# Patient Record
Sex: MALE | Race: WHITE | Employment: FULL TIME | URBAN - METROPOLITAN AREA
[De-identification: names, ages, dates, MRNs, and addresses within clinical notes are randomized per-mention and may not be internally consistent; named-entity substitution may affect disease eponyms.]

---

## 2021-02-16 ENCOUNTER — HOSPITAL ENCOUNTER (OUTPATIENT)
Dept: PHYSICAL THERAPY | Age: 66
Discharge: HOME | End: 2021-02-16
Payer: MEDICARE

## 2021-02-16 PROCEDURE — 97530 THERAPEUTIC ACTIVITIES: CPT

## 2021-02-16 PROCEDURE — 97110 THERAPEUTIC EXERCISES: CPT

## 2021-02-16 PROCEDURE — 97162 PT EVAL MOD COMPLEX 30 MIN: CPT

## 2021-02-16 NOTE — PROGRESS NOTES
PHYSICAL THERAPY - DAILY TREATMENT NOTE    Patient Name: Fer Carroll        Date: 2021  : 1955   YES Patient  Verified  Visit #:   1     Insurance: Payor: [place] MEDICARE / Plan: [place] MEDICARE PART A & B / Product Type: Medicare /      In time: 10:08 Out time: 10:50   Total Treatment Time: 42     Medicare/BCBS Time Tracking (below)   Total Timed Codes (min):  30 1:1 Treatment Time:  30     TREATMENT AREA =  L/S  SUBJECTIVE    Pain Level (on 0 to 10 scale):  0  / 10   Medication Changes/New allergies or changes in medical history, any new surgeries or procedures? YES    If yes, update Summary List   Subjective Functional Status/Changes:  []  No changes reported     History of Condition: Pt presents to PT with reports of chronic LBP that was exacerbated over the past few months. The pt lives an active lifestyle including theraband circuit training, walking on the treadmill, and golfing/surfing once a week. The pt has chronic medial epicondylitis which he has treated previously with dry needling offering temporary relief. The pt reports his symptoms are most noticeable while playing golf and surfing and is unaware of any particular movements or other treatment modalities to treat his pain at home. Aggravating Factors: Alleviating Factors:     Previous Treatment:     PMHx:see chart    Social/Recreational/Work:     Pt Goals: \"To be able to surf and golf for longer periods without being concerned for hurting my back. \"     FOTO: 63     LOW BACK EVALUATION    Objective:      Gait:    Posture:    Palpation/Sensation:    (N - normal; R - reduced; MR - markedly reduced)       L/S ROM      Range   Effect  Strength (MMT)          Right        Left    Flex N  Psoas (L2,3) 5 5   Ext MR  Quads (L3) 5 5   R-lat flex R  Ant tib(L4) 5 5   L-lat flex R  Hip Ext (S1, S2) 4+ 4+   R-rot R  Glut Med(L5) 5 5   L-rot R  Hamstrings(S1,S2) 5 5      Hip IR/ER 5/5 5/5     Strength (MMT)       Right     Left Core: Sup Bridge     Core: Side Bridge     Core: Prone plank       Special Tests                       Right     Left          Flexibility         Right              Left    Slump   90/90 HS     SLR   Samuel Hayes     Sl screen 3/5=70% LR   Adam     Gaenslen test   Hip IR (prone)     [place]/PAMELA sign   Hip ER (prone)     Thigh thrust        Distraction        Lateral Compression        Sacral Provocation          Effect of:  DKC    Supine Bridge    SL Supine Bridge    Prone on Elbows    RFIS    REIL          SI Symmetry:    Standing        Supine            Prone                Dynamic      +/- R/L  ASIS    Fwd Flex    IC    Stork    PSIS    Seated FF      Sup to Sit:     Modalities Rationale: To improve activity tolerance for strengthening exercises and ADL's.     min [] Estim, type/location:                                      []  att     []  unatt     []  w/US     []  w/ice    []  w/heat    min []  Mechanical Traction: type/lbs                   []  pro   []  sup   []  int   []  cont    []  before manual    []  after manual    min []  Ultrasound, settings/location:      min []  Iontophoresis w/ dexamethasone, location:                                               []  take home patch       []  in clinic    min []  Ice     []  Heat    location/position:     min []  Vasopneumatic Device, press/temp:     min []  Other:    [] Skin assessment post-treatment (if applicable):    [x]  intact    []  redness- no adverse reaction     []redness  adverse reaction:      15 min Therapeutic Exercise:  [x]  See flow sheet   Rationale:      increase ROM and increase strength to improve the patients ability to perform ADL's with improved hip flexor mobility and L/S AROM. 15 min Therapeutic Activity: FOTO, lifitng mechanics, sleep positioning education and demonstration   Rationale:    To improve safety and efficiency with ADL's.     min Patient Education:  []  Review HEP   []  Progressed/Changed HEP based on: HEP initiated      Other Objective/Functional Measures:    See above information     Post Treatment Pain Level (on 0 to 10) scale:   0  / 10     ASSESSMENT    Assessment/Changes in Function:     Justification for Eval Code Complexity: moderate  Patient History (low 0, mod 1-2, high 3-4): right UE medial epicondylitis, history of bilateral benitez procedures, bilateral knee OA HIGH     Examination (low 1-2, mod 3+, high 4+): LE AROM, LE MMT, L/S AROM, L/S repeated motions HIGH   Clinical Presentation (low: stable/uncomplicated; mod: evolving; high: unstable/unpredictable): evolving AROM with exercise MODERATE  Clinical Decision Making (low , mod 26-74, high 1-25): 63 moderate     []  See Progress Note/Recertification   Patient will continue to benefit from skilled PT services to modify and progress therapeutic interventions, address functional mobility deficits, address ROM deficits, address strength deficits, analyze and address soft tissue restrictions, analyze and cue movement patterns, analyze and modify body mechanics/ergonomics and assess and modify postural abnormalities to attain remaining goals.    Progress toward goals / Updated goals:    Goals established, see PoC     PLAN    [x]  Upgrade activities as tolerated YES Continue plan of care   []  Discharge due to :    [x]  Other: Initiate PoC     Therapist: Isabel Oseguera    Date: 2/16/2021 Time: 10:13 AM

## 2021-02-16 NOTE — PROGRESS NOTES
[facility] PHYSICAL THERAPY  [address] [facility] [facility], [address] - Phone: [phone]  Fax: [phone] / [address] [address] PHYSICAL THERAPY SERVICES  Patient Name: Belen Nelson : 1955   Medical   Diagnosis: Low back pain [M54.5] Treatment Diagnosis: Mechanical LBP   Onset Date: Chronic 2016     Referral Source: Shant Hooks MD [place] [facility]): 2021   Prior Hospitalization: See medical history Provider #:  [id number]   Prior Level of Function: Independent with ADL's, active lifestyle   Comorbidities: right UE medial epicondylitis, history of bilateral benitez procedures, bilateral knee OA    Medications: Verified on Patient Summary List   The Plan of Care and following information is based on the information from the initial evaluation.   ===========================================================================================  Assessment / key information:  Belen Nelson is a 77 y.o.  male with Dx: Low back pain [M54.5], signs and symptoms consistent with referring diagnosis. Pt presents to PT with reports of chronic LBP that was exacerbated over the past few months. The pt lives an active lifestyle including theraband circuit training, walking on the treadmill, and golfing/surfing once a week. The pt has chronic medial epicondylitis which he has treated previously with dry needling offering temporary relief. The pt reports his symptoms are most noticeable while playing golf and surfing and is unaware of any particular movements or other treatment modalities to treat his pain at home. Objective: The pt demonstrates minimal functional deficits including a 75-80% decrease in L/S extension AROM, and moderate hip flexor tightness. The pt demonstrates minimal hip girdle MMT deficits only including 4+/5 hip abduction MMT bilaterally.  The pt demonstrates no hamstring tightness (5 degrees bilateral on hamstring 90/90 test) and no TTP along the paraspinals, QL or hip flexors. The patient's FOTO score of 63 points indicates 37% limitation in functional ability. The patient would benefit from skilled PT to address the below listed impairments. Thank you for your referral.  =======================================l===================================================  Eval Complexity: History: HIGH Complexity :3+ comorbidities / personal factors will impact the outcome/ POC Exam:HIGH Complexity : 4+ Standardized tests and measures addressing body structure, function, activity limitation and / or participation in recreation  Presentation: MEDIUM Complexity : Evolving with changing characteristics  Clinical Decision Making:MEDIUM Complexity : FOTO score of 26-74Overall Complexity:MEDIUM    Problem List: pain affecting function, decrease ROM, decrease strength, impaired gait/ balance, decrease ADL/ functional abilitiies, decrease activity tolerance, decrease flexibility/ joint mobility and decrease transfer abilities   Treatment Plan may include any combination of the following: Therapeutic exercise, Therapeutic activities, Neuromuscular re-education, Physical agent/modality, Gait/balance training, Manual therapy, Patient education, Self Care training, Functional mobility training, Home safety training and Stair training    Patient / Family readiness to learn indicated by: asking questions, trying to perform skills and interest  Persons(s) to be included in education: patient (P)  Barriers to Learning/Limitations: None  Measures taken: na   Patient Goal (s): \"To be able to surf and golf for longer periods without being concerned for hurting my back. \"   Patient self reported health status: good  Rehabilitation Potential: good   Short Term Goals: To be accomplished in  2-3  weeks:  1. Patient will demonstrate compliance with HEP for symptom management at home.   2. Patient will report at least 3/7 on the [facility] JEROME in order to improved tolerance for surfing recreational activities. 3. Patient will demonstrates at least 50% extension L/S AROM in order to improve efficiency with reaching ADL's.  Long Term Goals: To be accomplished in  4-6  weeks:  1. Patient will be independent with HEP to self-manage and prevent symptoms upon DC. 2.  Patient will improve FOTO score to at least 70 points to indicate improved functional status. 3.  Patient will demonstrate 5/5 hip abduction MMT bilaterally to improve pelvic stability with recreational activity. Frequency / Duration:   Patient to be seen  1-2  times per week for 3-4  weeks:  Patient / Caregiver education and instruction: self care, activity modification and exercises      Therapist Signature: Tad Duncan DPT Date: 2/25/2158   Certification Period: 2/16/21 - 5/15/21 Time: 11:05 AM   ===========================================================================================  I certify that the above Physical Therapy Services are being furnished while the patient is under my care. I agree with the treatment plan and certify that this therapy is necessary. Physician Signature:        Date:       Time:        Maya Kathleen MD  Please sign and return to [facility] or you may fax the signed copy to [phone]. Thank you.

## 2021-02-23 ENCOUNTER — HOSPITAL ENCOUNTER (OUTPATIENT)
Dept: PHYSICAL THERAPY | Age: 66
Discharge: HOME | End: 2021-02-23
Payer: MEDICARE

## 2021-02-23 PROCEDURE — 97530 THERAPEUTIC ACTIVITIES: CPT

## 2021-02-23 PROCEDURE — 97110 THERAPEUTIC EXERCISES: CPT

## 2021-02-23 NOTE — PROGRESS NOTES
PHYSICAL THERAPY - DAILY TREATMENT NOTE    Patient Name: Nitesh Reza        Date: 2021  : 1955   yes Patient  Verified  Visit #:     Insurance: Payor: Hector Joya / Plan: [place] MEDICARE PART A & B / Product Type: Medicare /      In time: 544 Out time: 815   Total Treatment Time: 50     Medicare/BCBS Time Tracking (below)   Total Timed Codes (min):  50 1:1 Treatment Time:  50     TREATMENT AREA =  Low back pain [M54.5]    SUBJECTIVE  Pain Level (on 0 to 10 scale):  3  / 10   Medication Changes/New allergies or changes in medical history, any new surgeries or procedures?    no  If yes, update Summary List   Subjective Functional Status/Changes:  []  No changes reported     \"I tried the HEP she gave me, not much pain relief\"          OBJECTIVE    35 min Therapeutic Exercise:  [x]  See flow sheet   Rationale:      increase ROM, increase strength and improve coordination to improve the patients ability to safely perform ADLs, bending/stooping/ lifting; prolong sitting, standing and ambulation; and hold yoga poses with no pain and with min to no limitations       15 min Therapeutic Activity: [x]  See flow sheet  postural/bed mobility training  squat IR/ER   whitk fwd/lateral   Rationale:    increase ROM, increase strength and improve coordination to improve the patients ability to  safely perform ADLs, bending/stooping/ lifting; prolong sitting, standing and ambulation; and hold yoga poses with no pain and with min to no limitations    Billed With/As:   [x] TE   [x] TA   [] Neuro   [] Self Care Patient Education: [x] Review HEP    [] Progressed/Changed HEP based on:   [x] positioning   [x] body mechanics   [x] transfers   [x] heat/ice application    [x] other: Pt ed on importance and benefits of compliance with HEP, core strength/stability and proper posture; pt verbalized understanding         Other Objective/Functional Measures:    VCs + demo to perform proper technique for TE  Initiated TE per flowsheet without c/o p! Reviewed proper bed mobility, sleeping positions, lifting techniques and importance and benefits of a neutral spine    ISAI>REIL= B/B decrease \"tightness\"   Post Treatment Pain Level (on 0 to 10) scale:   1  / 10     ASSESSMENT  Assessment/Changes in Function:     Progressed there-ex without c/o increase p!  demos decrease HS flexibility B LE  VCs to decrease both knee anterior translation with squats     []  See Progress Note/Recertification   Patient will continue to benefit from skilled PT services to modify and progress therapeutic interventions, address functional mobility deficits, address ROM deficits, address strength deficits, analyze and address soft tissue restrictions, analyze and cue movement patterns, analyze and modify body mechanics/ergonomics, assess and modify postural abnormalities and instruct in home and community integration to attain remaining goals.    Progress toward goals / Updated goals:  Pt's first visit since IE, no noted progress        PLAN  [x]  Upgrade activities as tolerated yes Continue plan of care   []  Discharge due to :    []  Other:      Therapist: Wilian De La Vega PTA    Date: 2/23/2021 Time: 7:39 AM     Future Appointments   Date Time Provider German Campos   2/25/2021  9:30 AM Litzy Gonsalez PT [facility]   3/2/2021  7:15 AM Romaine Gutierrez PTA [facility]   3/4/2021  8:00 AM Kirby Drew PT [facility]   3/9/2021  7:15 AM IBETH DaughertyPTKALYANI [facility]   3/11/2021  8:00 AM Kirby Drew PT [facility]   3/16/2021  7:15 AM IBETH Daugherty [facility]   3/18/2021  8:00 AM Kirby Drew PT [facility]   3/23/2021  7:15 AM IBETH Daugherty [facility]   3/25/2021  8:00 AM Kirby Drew, PT [facility]   3/30/2021  7:15 AM Sarika Whelan, PTA [facility]

## 2021-02-25 ENCOUNTER — HOSPITAL ENCOUNTER (OUTPATIENT)
Dept: PHYSICAL THERAPY | Age: 66
Discharge: HOME | End: 2021-02-25
Payer: MEDICARE

## 2021-02-25 PROCEDURE — 97110 THERAPEUTIC EXERCISES: CPT

## 2021-02-25 PROCEDURE — 97112 NEUROMUSCULAR REEDUCATION: CPT

## 2021-02-25 PROCEDURE — 97530 THERAPEUTIC ACTIVITIES: CPT

## 2021-02-25 NOTE — PROGRESS NOTES
PHYSICAL THERAPY - DAILY TREATMENT NOTE    Patient Name: Le Webster        Date: 2021  : 1955   YES Patient  Verified  Visit #:   3   of   12  Insurance: Payor: Adebayo Davis / Plan: [place] MEDICARE PART A & B / Product Type: Medicare /      In time: 9:25 Out time: 10:10   Total Treatment Time: 45     Medicare/BCBS Time Tracking (below)   Total Timed Codes (min):  45 1:1 Treatment Time:  45     TREATMENT AREA = Low back pain [M54.5]    SUBJECTIVE    Pain Level (on 0 to 10 scale):  2-3   10   Medication Changes/New allergies or changes in medical history, any new surgeries or procedures? NO    If yes, update Summary List   Subjective Functional Status/Changes:  []  No changes reported     \"Not so bad right now. Do you think I'll have to do these types of exercises the rest of my life? \"          OBJECTIVE     Therapeutic Procedures:  Min Procedure Specifics + Rationale   n/a [x]  Patient Education (performed throughout session) [x] Review HEP    [] Progressed/Changed HEP based on:   [] proper performance and advancement of Therex/TA   [] reduction in pain level    [] increased functional capacity       [] change in directional preference   20 [x] Therapeutic Exercise   [x]  See Flowsheet   Rationale: increase ROM and increase strength to improve the patients ability to participate in ADL's    15 [x] Therapeutic Activity   [x]  See Flowsheet  Hinging with stick  Loaded carries with 20#kb  Bottom's up KB static hold  Rationale: To improve safety, proprioception, coordination, and efficiency with tasks   10 [x] Neuromuscular Re-ed   [x]  See Flowsheet  Bird Dogs  BASIL/REIL PRN  Rationale: increase ROM, increase strength, improve coordination, improve balance and increase proprioception  to improve the patients ability to safely participate in ADL's         Other Objective/Functional Measures:    Increased reps/sets/resistance per flow sheet.        Post Treatment Pain Level (on 0 to 10) scale:   1  / 10     ASSESSMENT    Assessment/Changes in Function:       Increased reps/sets/resistance per flow sheet. Patient will continue to benefit from skilled PT services to modify and progress therapeutic interventions, address functional mobility deficits, address ROM deficits, address strength deficits, analyze and address soft tissue restrictions, analyze and cue movement patterns, analyze and modify body mechanics/ergonomics and instruct in home and community integration  to attain remaining goals   Progress toward goals / Updated goals:    Compliant towards HEP     PLAN    [x]  Upgrade activities as tolerated  [x]  Update interventions per flow sheet YES Continue plan of care   []  Discharge due to :    []  Other:      Therapist: Alyce Lee \"BJ\" Buddy, DPT, Cert. MDT, Cert. DN, Cert. SMT, Dip.  Osteopractic    Date: 2/25/2021 Time: 9:27 AM     Future Appointments   Date Time Provider German Campos   2/25/2021  9:30 AM Joe Bueno, PT [facility]   3/2/2021  7:15 AM Myriam De Santiago PTA [facility]   3/4/2021  8:00 AM Joe Bueno PT [facility]   3/9/2021  7:15 AM Myriam De Santiago PTA [facility]   3/11/2021  8:00 AM Joe Bueno PT [facility]   3/16/2021  7:15 AM Myriam De Santiago PTA [facility]   3/18/2021  8:00 AM Joe Bueno PT [facility]   3/23/2021  7:15 AM Myriam De Santiago PTA [facility]   3/25/2021  8:00 AM Joe Bueno PT [facility]   3/30/2021  7:15 AM Sarika Whelan, PTA [facility]

## 2021-03-02 ENCOUNTER — HOSPITAL ENCOUNTER (OUTPATIENT)
Dept: PHYSICAL THERAPY | Age: 66
Discharge: HOME | End: 2021-03-02
Payer: MEDICARE

## 2021-03-02 PROCEDURE — 97110 THERAPEUTIC EXERCISES: CPT

## 2021-03-02 PROCEDURE — 97530 THERAPEUTIC ACTIVITIES: CPT

## 2021-03-02 NOTE — PROGRESS NOTES
PHYSICAL THERAPY - DAILY TREATMENT NOTE    Patient Name: Dalila Palacios        Date: 3/2/2021  : 1955   yes Patient  Verified  Visit #:      12  Insurance: Payor: Kitty Chang / Plan: [place] MEDICARE PART A & B / Product Type: Medicare /      In time: 621 Out time: 789   Total Treatment Time: 51     Medicare/BCBS Time Tracking (below)   Total Timed Codes (min):  51 1:1 Treatment Time:  51     TREATMENT AREA =  Low back pain [M54.5]    SUBJECTIVE  Pain Level (on 0 to 10 scale): \"good\" 0-1 / 10   Medication Changes/New allergies or changes in medical history, any new surgeries or procedures?    no  If yes, update Summary List   Subjective Functional Status/Changes:  []  No changes reported     \"I had a good weekend I went golfing and surfing\"          OBJECTIVE    26 min Therapeutic Exercise:  [x]  See flow sheet   Rationale:      increase ROM, increase strength and improve coordination to improve the patients ability to safely perform ADLs, bending/stooping/ lifting; prolong sitting, standing and ambulation; and hold yoga poses with no pain and with min to no limitations       25 min Therapeutic Activity: [x]  See flow sheet  postural/bed mobility training  squat IR/ER   monsterwalk fwd/retro/lateral  suitcase carry 20#  static hold Bottoms up   Rationale:    increase ROM, increase strength and improve coordination to improve the patients ability to  safely perform ADLs, bending/stooping/ lifting; prolong sitting, standing and ambulation; and hold yoga poses with no pain and with min to no limitations    Billed With/As:   [x] TE   [x] TA   [] Neuro   [] Self Care Patient Education: [x] Review HEP    [] Progressed/Changed HEP based on:   [x] positioning   [x] body mechanics   [x] transfers   [x] heat/ice application    [x] other: Pt ed on importance and benefits of compliance with HEP, core strength/stability and proper posture; pt verbalized understanding  See updated HEP in chart       Other Objective/Functional Measures:    VCs + demo to perform proper technique for TE    Initiated retro monsterwalk, and pallof press #2,3 without c/o p!    (I) with suitcase carry x 60' wit 20#KB     Post Treatment Pain Level (on 0 to 10) scale:  0-1  / 10     ASSESSMENT  Assessment/Changes in Function:   demos proper squat form with instruction   demos good static 10# hold with bottoms up TE   []  See Progress Note/Recertification   Patient will continue to benefit from skilled PT services to modify and progress therapeutic interventions, address functional mobility deficits, address ROM deficits, address strength deficits, analyze and address soft tissue restrictions, analyze and cue movement patterns, analyze and modify body mechanics/ergonomics, assess and modify postural abnormalities and instruct in home and community integration to attain remaining goals. Progress toward goals / Updated goals: · Short Term Goals: To be accomplished in  2-3  weeks:  1. Patient will demonstrate compliance with HEP for symptom management at home. Established HEP  2. Patient will report at least 3/7 on the [facility] JEROME in order to improved tolerance for surfing recreational activities. 3. Patient will demonstrates at least 50% extension L/S AROM in order to improve efficiency with reaching ADL's.progressing, addressed with REIL  · Long Term Goals: To be accomplished in  4-6  weeks:  1. Patient will be independent with HEP to self-manage and prevent symptoms upon DC. 2.  Patient will improve FOTO score to at least 70 points to indicate improved functional status.   3.  Patient will demonstrate 5/5 hip abduction MMT bilaterally to improve pelvic stability with recreational activity     PLAN  [x]  Upgrade activities as tolerated yes Continue plan of care   []  Discharge due to :    []  Other:      Therapist: Herman Perez PTA    Date: 3/2/2021 Time: 8:37 AM     Future Appointments   Date Time Provider German Campos   3/4/2021  8:00 AM Cherry Vera, PT [facility]   3/9/2021  7:15 AM Cammie Hutchison, PTA [facility]   3/11/2021  8:00 AM Cherry Vera, PT [facility]   3/16/2021  7:15 AM Cammie Hutchison, PTA [facility]   3/18/2021  8:00 AM Cherry Vera, PT [facility]   3/23/2021  7:15 AM Cammie Hutchison, PTA [facility]   3/25/2021  8:00 AM Cherry Vera, PT [facility]   3/30/2021  7:15 AM Sarika Whelan, PTA [facility]

## 2021-03-04 ENCOUNTER — HOSPITAL ENCOUNTER (OUTPATIENT)
Dept: PHYSICAL THERAPY | Age: 66
Discharge: HOME | End: 2021-03-04
Payer: MEDICARE

## 2021-03-04 PROCEDURE — 97112 NEUROMUSCULAR REEDUCATION: CPT

## 2021-03-04 PROCEDURE — 97530 THERAPEUTIC ACTIVITIES: CPT

## 2021-03-04 NOTE — PROGRESS NOTES
PHYSICAL THERAPY - DAILY TREATMENT NOTE    Patient Name: Montana Hagen        Date: 3/4/2021  : 1955   YES Patient  Verified  Visit #:     Insurance: Payor: Kevin Keep / Plan: [place] MED[place] PART A & B / Product Type: Medicare /      In time: 7:54 Out time: 8:50   Total Treatment Time: 56     Medicare/BCBS Time Tracking (below)   Total Timed Codes (min):  56 1:1 Treatment Time:  56     TREATMENT AREA = Low back pain [M54.5]    SUBJECTIVE    Pain Level (on 0 to 10 scale):  0-1  / 10   Medication Changes/New allergies or changes in medical history, any new surgeries or procedures? NO    If yes, update Summary List   Subjective Functional Status/Changes:  []  No changes reported     \"I'm getting better\"          OBJECTIVE     Therapeutic Procedures:  Min Procedure Specifics + Rationale   n/a [x]  Patient Education (performed throughout session) [x] Review HEP    [] Progressed/Changed HEP based on:   [] proper performance and advancement of Therex/TA   [] reduction in pain level    [] increased functional capacity       [] change in directional preference   41 [x] Therapeutic Activity   [x]  See Flowsheet  KB Standing Pull Over  KB Around the world feet shoulder width/feet together  1/2 kneeling KB Pullover   Rope figure 8's  KB Bottom's Up Carry in 90 degree flexion  KB Pullover to Goblet Squat  Plank of knees  Rationale:  To improve safety, proprioception, coordination, and efficiency with tasks   15 [x] Neuromuscular Re-ed   [x]  See Flowsheet  Bird Dogs  REIL Progressions    Rationale: increase ROM, increase strength, improve coordination, improve balance and increase proprioception  to improve the patients ability to safely participate in ADL's         Other Objective/Functional Measures:    Performed Pallof series, then advanced #1 in 1/2 kneeling position (all 4 positions) and #3 in tall kneelinbg position     Post Treatment Pain Level (on 0 to 10) scale:   0  / 10 ASSESSMENT    Assessment/Changes in Function:       Right flank/obliques challenged by Pallof presses in half kneeling and right side plank         Patient will continue to benefit from skilled PT services to modify and progress therapeutic interventions, address functional mobility deficits, address ROM deficits, address strength deficits, analyze and address soft tissue restrictions, analyze and cue movement patterns, analyze and modify body mechanics/ergonomics and instruct in home and community integration  to attain remaining goals   Progress toward goals / Updated goals:    Good form and performance under load. PLAN    [x]  Upgrade activities as tolerated  [x]  Update interventions per flow sheet YES Continue plan of care   []  Discharge due to :    []  Other:      Therapist: Edgard Person \"BJ\" Buddy, MARIE, Cert. MDT, Cert. DN, Cert. SMT, Dip.  Osteopractic    Date: 3/4/2021 Time: 7:54 AM     Future Appointments   Date Time Provider German Campos   3/4/2021  8:00 AM Mancil Fallow, PT [facility]   3/9/2021  7:15 AM Brenita , PTA [facility]   3/11/2021  8:00 AM Mancil Fallow, PT [facility]   3/16/2021  7:15 AM Brenita , PTA [facility]   3/18/2021  8:00 AM Mancil Fallow, PT [facility]   3/23/2021  7:15 AM Brenita , PTA [facility]   3/25/2021  8:00 AM Mancil Fallow, PT [facility]   3/30/2021  7:15 AM Sarika Whelan, PTA [facility]

## 2021-03-09 ENCOUNTER — HOSPITAL ENCOUNTER (OUTPATIENT)
Dept: PHYSICAL THERAPY | Age: 66
Discharge: HOME | End: 2021-03-09
Payer: MEDICARE

## 2021-03-09 PROCEDURE — 97110 THERAPEUTIC EXERCISES: CPT

## 2021-03-09 PROCEDURE — 97530 THERAPEUTIC ACTIVITIES: CPT

## 2021-03-09 NOTE — PROGRESS NOTES
PHYSICAL THERAPY - DAILY TREATMENT NOTE    Patient Name: Sylvia Nuñez        Date: 3/9/2021  : 1955   yes Patient  Verified  Visit #:      12  Insurance: Payor: William Herrera / Plan: [place] MEDICARE PART A & B / Product Type: Medicare /      In time: 936 Out time:  380   Total Treatment Time: 55      Medicare/BCBS Time Tracking (below)   Total Timed Codes (min):  55 1:1 Treatment Time:  55     TREATMENT AREA =  Low back pain [M54.5]    SUBJECTIVE  Pain Level (on 0 to 10 scale): 0 / 10   Medication Changes/New allergies or changes in medical history, any new surgeries or procedures?    no  If yes, update Summary List   Subjective Functional Status/Changes:  []  No changes reported     \"Just sore from yard walk, I was up and down doing the things.  I am feeling good\"          OBJECTIVE    25 min Therapeutic Exercise:  [x]  See flow sheet   Rationale:      increase ROM, increase strength and improve coordination to improve the patients ability to safely perform ADLs, bending/stooping/ lifting; prolong sitting, standing and ambulation; and hold yoga poses with no pain and with min to no limitations        30 min Therapeutic Activity: [x]  See flow sheet  postural training  squat IR/ER   monsterwalk fwd/retro/lateral  10 # KB Around the world feet shoulder width/feet together  10# 1/2 kneeling KB Pullover   10 # KB Bottom's Up Carry in 90 degree flexion  20# KB to Goblet Squat to 18''   Plank of knees   Rationale:    increase ROM, increase strength and improve coordination to improve the patients ability to  safely perform ADLs, bending/stooping/ lifting; prolong sitting, standing and ambulation; and hold yoga poses with no pain and with min to no limitations    Billed With/As:   [x] TE   [x] TA   [] Neuro   [] Self Care Patient Education: [x] Review HEP    [] Progressed/Changed HEP based on:   [x] positioning   [x] body mechanics   [x] transfers   [x] heat/ice application    [x] other: Pt ed on importance and benefits of compliance with HEP, core strength/stability and proper posture; pt verbalized understanding  added pallof press to HEP     Other Objective/Functional Measures:    VCs + demo to perform proper technique for TE    CCs to decrease anterior translation to promote proper tech    performed open book at wall, decrease right T/s rot AROM  (I) with bottoms up carry x 60' with 10# KB     Post Treatment Pain Level (on 0 to 10) scale:  0/ 10     ASSESSMENT  Assessment/Changes in Function:   demelvis fwd head and chest with KB OH TE, able to correct with instruction   clarence good goblet squat form with 20# KB to 25''    []  See Progress Note/Recertification   Patient will continue to benefit from skilled PT services to modify and progress therapeutic interventions, address functional mobility deficits, address ROM deficits, address strength deficits, analyze and address soft tissue restrictions, analyze and cue movement patterns, analyze and modify body mechanics/ergonomics, assess and modify postural abnormalities and instruct in home and community integration to attain remaining goals. Progress toward goals / Updated goals: · Short Term Goals: To be accomplished in  2-3  weeks:  1. Patient will demonstrate compliance with HEP for symptom management at home. MET- compliant with HEP  2. Patient will report at least 3/7 on the [employer] JEROME in order to improved tolerance for surfing recreational activities. 3. Patient will demonstrates at least 50% extension L/S AROM in order to improve efficiency with reaching ADL's.progressing, addressed with REIL  · Long Term Goals: To be accomplished in  4-6  weeks:  1. Patient will be independent with HEP to self-manage and prevent symptoms upon DC. 2.  Patient will improve FOTO score to at least 70 points to indicate improved functional status.   3.  Patient will demonstrate 5/5 hip abduction MMT bilaterally to improve pelvic stability with recreational activity     PLAN  [x] Upgrade activities as tolerated yes Continue plan of care   []  Discharge due to :    []  Other:      Therapist: Bertrand Yoder PTA    Date: 3/9/2021 Time: 8:47 AM     Future Appointments   Date Time Provider German Campos   3/11/2021  8:00 AM Harvey Degree, PT [facility]   3/16/2021  7:15 AM Christina Lan, PTA [facility]   3/18/2021  8:00 AM Harvey Degree, PT [facility]   3/23/2021  7:15 AM Christina Lan, IBETH [facility]   3/25/2021  8:00 AM Harvey Harvey, PT [facility]   3/30/2021  7:15 AM [facility]

## 2021-03-11 ENCOUNTER — HOSPITAL ENCOUNTER (OUTPATIENT)
Dept: PHYSICAL THERAPY | Age: 66
Discharge: HOME | End: 2021-03-11
Payer: MEDICARE

## 2021-03-11 PROCEDURE — 97530 THERAPEUTIC ACTIVITIES: CPT

## 2021-03-11 NOTE — PROGRESS NOTES
[address] [address] [facility] PHYSICAL THERAPY  [address] [place], [address] - Phone: [phone]  Fax: [phone]  PROGRESS NOTE  Patient Name: Meghan Marie : 1955   Treatment/Medical Diagnosis: Low back pain [M54.5]   Referral Source: Holly Calhoun MD     Date of Initial Visit: 21 Attended Visits: 7 Missed Visits: 0     SUMMARY OF TREATMENT  Patient's POC has consisted of therex, therapeutic activities, manual therapy prn, modalities prn, pt. education, and a comprehensive HEP. Treatment strategies used to address functional mobility deficits, ROM deficits, strength deficits, analyze and address soft tissue restrictions, analyze and cue movement patterns, analyze and modify body mechanics/ergonomics, assess and modify postural abnormalities and instruct in home and community integration. CURRENT STATUS  Patient making excellent progress, reducing overall pain and improving strength for ADL's. Treatments have revealed lack of mobility to his hips and ankles which contribute to compensatory movement patterns that compromise his L/S when performing higher level activities like yard work. Goal/Measure of Progress Goal Met? 1. Patient will demonstrate compliance with HEP for symptom management at home. 2. Patient will report at least 3/7 on the [facility][employer] JEROME in order to improved tolerance for surfing recreational activities. 3. Patient will demonstrates at least 50% extension L/S AROM in order to improve efficiency with reaching ADL's MET    MET      MET     New Goals to be achieved in __2-4__  weeks:  1. Patient will be independent with HEP to self-manage and prevent symptoms upon DC. 2.  Patient will improve FOTO score to at least 70 points to indicate improved functional status. 3.  Patient will demonstrate 5/5 hip abduction MMT bilaterally to improve pelvic stability with recreational activity.   4. Patient to demonstrate safe and proper floor to stand transfers to promote safety and independence when performing yard work. Frequency / Duration:   Patient to be seen  2  times per week for 2-4  weeks:    RECOMMENDATIONS  Continue and progress functional therex/therapeutic activity as able, utilizing manual therapy and modalities prn. Progress patient towards independent HEP to facilitate self-management of symptoms and progress gains after PT. If you have any questions/comments please contact us directly at [phone]. Thank you for allowing us to assist in the care of your patient. Therapist Signature: Carl Oneal \"PEDRO\" Roseline Cadena, ZEUST, Cert. MDT, Cert. DN, Cert. SMT, Dip. Osteopractic Date: 3/29/0534   Certification Period: 2/16/21-5/15/21     Reporting Period 2/16/21-3/11/21   Time: 9:00 AM   NOTE TO PHYSICIAN:  PLEASE COMPLETE THE ORDERS BELOW AND FAX TO   [facility] [place] [facility]: [phone]. If you are unable to process this request in 24 hours please contact our office: [phone].    ___ I have read the above report and request that my patient continue as recommended.   ___ I have read the above report and request that my patient continue therapy with the following changes/special instructions:_________________________________________________________   ___ I have read the above report and request that my patient be discharged from therapy.      Physician Signature:        Date:       Time:                                        Lupe Samson MD

## 2021-03-11 NOTE — PROGRESS NOTES
PHYSICAL THERAPY - DAILY TREATMENT NOTE    Patient Name: Jordon Mead        Date: 3/11/2021  : 1955   YES Patient  Verified  Visit #:     Insurance: Payor: Cash Cummings / Plan: [place] MEDICARE PART A & B / Product Type: Medicare /      In time: 7:55 Out time: 8:47   Total Treatment Time: 53     Medicare/BCBS Time Tracking (below)   Total Timed Codes (min):  53 1:1 Treatment Time:  53     TREATMENT AREA = Low back pain [M54.5]    SUBJECTIVE    Pain Level (on 0 to 10 scale):  0  / 10   Medication Changes/New allergies or changes in medical history, any new surgeries or procedures? NO    If yes, update Summary List   Subjective Functional Status/Changes:  []  No changes reported     \"The back is actually doing ok. My issue is my right knee right now. I got home from work and did about an hour of yard work using pruning aakash and I had to squat to protect my back. \"          OBJECTIVE     Therapeutic Procedures:  Min Procedure Specifics + Rationale   n/a [x]  Patient Education (performed throughout session) [x] Review HEP    [] Progressed/Changed HEP based on:   [] proper performance and advancement of Therex/TA   [] reduction in pain level    [] increased functional capacity       [] change in directional preference   53 [x] Therapeutic Activity   [x]  See Flowsheet  Around the World   Around the World to Shotput catch  KB Bottom's Up 90 degree walk  [place] Goblet Squat to plinth  Goblet Squat to plinth  SK Racked Squat  Repeated DF in standing  Clubbell Racked Squat to plinth  Rationale: To improve safety, proprioception, coordination, and efficiency with tasks         Other Objective/Functional Measures:    Noted lack of ankle mobility during squat progression.  Provided ankle self mobilization       Post Treatment Pain Level (on 0 to 10) scale:   0  / 10     ASSESSMENT    Assessment/Changes in Function:       See PN         Patient will continue to benefit from skilled PT services to modify and progress therapeutic interventions, address functional mobility deficits, address ROM deficits, address strength deficits, analyze and address soft tissue restrictions, analyze and cue movement patterns, analyze and modify body mechanics/ergonomics and instruct in home and community integration  to attain remaining goals   Progress toward goals / Updated goals:    See PN     PLAN    [x]  Upgrade activities as tolerated  [x]  Update interventions per flow sheet YES Continue plan of care   []  Discharge due to :    []  Other:      Therapist: Dharmesh Trejo \"BJ\" MARIE Goodwin, Cert. MDT, Cert. DN, Cert. SMT, Dip.  Osteopractic    Date: 3/11/2021 Time: 7:59 AM     Future Appointments   Date Time Provider German Campos   3/11/2021  8:00 AM Harvey Degree, PT [facility]   3/16/2021  7:15 AM Christina Lan, IBETH [facility]   3/18/2021  8:00 AM Harvey Degree, PT [facility]   3/23/2021  7:15 AM Christina Lan PTA [facility]   3/25/2021  8:00 AM Harvey Harvey, PT [facility]   3/30/2021  7:15 AM IBETH Carson

## 2021-03-16 ENCOUNTER — HOSPITAL ENCOUNTER (OUTPATIENT)
Dept: PHYSICAL THERAPY | Age: 66
Discharge: HOME | End: 2021-03-16
Payer: MEDICARE

## 2021-03-16 PROCEDURE — 97110 THERAPEUTIC EXERCISES: CPT

## 2021-03-16 PROCEDURE — 97530 THERAPEUTIC ACTIVITIES: CPT

## 2021-03-16 NOTE — PROGRESS NOTES
PHYSICAL THERAPY - DAILY TREATMENT NOTE    Patient Name: Jordon Mead        Date: 3/16/2021  : 1955   yes Patient  Verified  Visit #:      12  Insurance: Payor: Cash Cummings / Plan: [place] MEDICARE PART A & B / Product Type: Medicare /      In time: 360 Out time: 663   Total Treatment Time: 52     Medicare/BCBS Time Tracking (below)   Total Timed Codes (min):   52 1:1 Treatment Time:  52     TREATMENT AREA =  Low back pain [M54.5]    SUBJECTIVE  Pain Level (on 0 to 10 scale): 0 / 10   Medication Changes/New allergies or changes in medical history, any new surgeries or procedures?    no  If yes, update Summary List   Subjective Functional Status/Changes:  []  No changes reported     \"Surfed Friday, golfed Saturday, and was in the yard .  I was sore for sure but the pain is better\"     OBJECTIVE    22 min Therapeutic Exercise:  [x]  See flow sheet   Rationale:      increase ROM, increase strength and improve coordination to improve the patients ability to safely perform ADLs, bending/stooping/ lifting; prolong sitting, standing and ambulation; and hold yoga poses with no pain and with min to no limitations        30 min Therapeutic Activity: [x]  See flow sheet  postural training  squat IR/ER   monsterwalk fwd/retro/lateral  10 # KB Around the world feet shoulder width/feet together  10# 1/2 kneeling KB Pullover   10 # KB Bottom's Up Carry in 90 degree flexion  RDL with 20# KB  farmer's carry   Rationale:    increase ROM, increase strength and improve coordination to improve the patients ability to  safely perform ADLs, bending/stooping/ lifting; prolong sitting, standing and ambulation; and hold yoga poses with no pain and with min to no limitations    Billed With/As:   [x] TE   [x] TA   [] Neuro   [] Self Care Patient Education: [x] Review HEP    [] Progressed/Changed HEP based on:   [x] positioning   [x] body mechanics   [x] transfers   [x] heat/ice application    [x] other: Pt ed on importance and benefits of compliance with HEP, core strength/stability and proper posture; pt verbalized understanding  added pallof press to HEP     Other Objective/Functional Measures:    VCs + demo to perform proper technique for TE  (I) with farmers carry x 70' ea UE with 20# KB/25 #KB  VCs to decrease knee flex with RDLs to promote glut/HS activation  (I) with front rack carry with 10# KB  x 70' ea UE  initiated butt burners, and dynamic HS str without pain or difficulty    Post Treatment Pain Level (on 0 to 10) scale:  0/ 10     ASSESSMENT  Assessment/Changes in Function:   increased to GTB with monsterwalk with no c/o p!  no limitations today   demos p! free, and proper RDL with instruction    []  See Progress Note/Recertification   Patient will continue to benefit from skilled PT services to modify and progress therapeutic interventions, address functional mobility deficits, address ROM deficits, address strength deficits, analyze and address soft tissue restrictions, analyze and cue movement patterns, analyze and modify body mechanics/ergonomics, assess and modify postural abnormalities and instruct in home and community integration to attain remaining goals.    Progress toward goals / Updated goals:  See PN last visit, no changes noted at this time       PLAN  [x]  Upgrade activities as tolerated yes Continue plan of care   []  Discharge due to :    []  Other:      Therapist: Herman Perez PTA    Date: 3/16/2021 Time: 8:19 AM     Future Appointments   Date Time Provider German Campos   3/18/2021  8:00 AM Socorro Cardozo PT [facility]   3/23/2021  7:15 AM Mariana Quintana PTA [facility]   3/25/2021  8:00 AM Socorro Cardozo PT [facility]   3/30/2021  7:15 AM HenlineDeleta Dandy, PTA [facility]

## 2021-03-18 ENCOUNTER — HOSPITAL ENCOUNTER (OUTPATIENT)
Dept: PHYSICAL THERAPY | Age: 66
Discharge: HOME | End: 2021-03-18
Payer: MEDICARE

## 2021-03-18 PROCEDURE — 97110 THERAPEUTIC EXERCISES: CPT

## 2021-03-18 PROCEDURE — 97530 THERAPEUTIC ACTIVITIES: CPT

## 2021-03-18 NOTE — PROGRESS NOTES
PHYSICAL THERAPY - DAILY TREATMENT NOTE    Patient Name: Yogi Whatley        Date: 3/18/2021  : 1955   YES Patient  Verified  Visit #:     Insurance: Payor: Angella Reyes / Plan: [place] MEDICARE PART A & B / Product Type: Medicare /      In time: 7:50 Out time: 8:45   Total Treatment Time: 55     Medicare/BCBS Time Tracking (below)   Total Timed Codes (min):  55 1:1 Treatment Time:  53     TREATMENT AREA = Low back pain [M54.5]    SUBJECTIVE    Pain Level (on 0 to 10 scale):  0  / 10   Medication Changes/New allergies or changes in medical history, any new surgeries or procedures? NO    If yes, update Summary List   Subjective Functional Status/Changes:  []  No changes reported     \"I feel like I'm getting stronger and stronger. \"          OBJECTIVE     Therapeutic Procedures:  Min Procedure Specifics + Rationale   n/a [x]  Patient Education (performed throughout session) [x] Review HEP    [] Progressed/Changed HEP based on:   [] proper performance and advancement of Therex/TA   [] reduction in pain level    [] increased functional capacity       [] change in directional preference   10 [x] Therapeutic Exercise   [x]  See Flowsheet   Rationale: increase ROM and increase strength to improve the patients ability to participate in ADL's    45(43 [x] Therapeutic Activity   [x]  See Flowsheet  Loaded carries with KB  Johnny Deanz carry with 20# KB  -Racked Carry with 20# Kb  -'s Carry with 10# KB  Offset Carry suitcase/rack (20/10)  Offset Carry Bottom's Up 's/Suitcase (/20)  Goblet Sit <->stand 2x10 c 10#  KB ATB x10 c 20#  Standing KB Pullover x10 10#  H/L KB Pulover 5#, 10#  TGU Roll to Elbow (Shoe)  Rationale: To improve safety, proprioception, coordination, and efficiency with tasks         Other Objective/Functional Measures:    Noted lack of right dorsiflexion. Left oblique sling weaker than right.       Post Treatment Pain Level (on 0 to 10) scale:   0  / 10 ASSESSMENT    Assessment/Changes in Function:       Per above, movement patterns improved with VC's         Patient will continue to benefit from skilled PT services to modify and progress therapeutic interventions, address functional mobility deficits, address ROM deficits, address strength deficits, analyze and address soft tissue restrictions, analyze and cue movement patterns, analyze and modify body mechanics/ergonomics and instruct in home and community integration  to attain remaining goals   Progress toward goals / Updated goals:    Progressing in functional strength and mobility     PLAN    [x]  Upgrade activities as tolerated  [x]  Update interventions per flow sheet YES Continue plan of care   []  Discharge due to :    []  Other:      Therapist: Angelique Narayan \"BJ\" Tip Pop, DPT, Cert. MDT, Cert. DN, Cert. SMT, Dip.  Osteopractic    Date: 3/18/2021 Time: 8:04 AM     Future Appointments   Date Time Provider German Campos   3/23/2021  7:15 AM Shalonda May PTA [facility]   3/25/2021  8:00 AM Peace Navarro, PT [facility]   3/30/2021  7:15 AM Shalonda May PTA [facility]   4/6/2021  7:15 AM Shalonda May PTA [facility]   4/8/2021  8:00 AM Peace Navarro, PT [facility]   4/13/2021  7:15 AM Shalonda May PTA [facility]   4/15/2021  8:00 AM Peace Navarro, PT [facility]

## 2021-03-23 ENCOUNTER — HOSPITAL ENCOUNTER (OUTPATIENT)
Dept: PHYSICAL THERAPY | Age: 66
Discharge: HOME | End: 2021-03-23
Payer: MEDICARE

## 2021-03-23 PROCEDURE — 97110 THERAPEUTIC EXERCISES: CPT

## 2021-03-23 PROCEDURE — 97530 THERAPEUTIC ACTIVITIES: CPT

## 2021-03-23 NOTE — PROGRESS NOTES
PHYSICAL THERAPY - DAILY TREATMENT NOTE    Patient Name: Le Webster        Date: 3/23/2021  : 1955   yes Patient  Verified  Visit #:   10  of   12  Insurance: Payor: Adebayo Davis / Plan: [place] MED[place]ARE PART A & B / Product Type: Medicare /      In time: 435 Out time: 810   Total Treatment Time: 52     Medicare/BCBS Time Tracking (below)   Total Timed Codes (min):   52 1:1 Treatment Time:  52     TREATMENT AREA =  Low back pain [M54.5]    SUBJECTIVE  Pain Level (on 0 to 10 scale): 0 / 10   Medication Changes/New allergies or changes in medical history, any new surgeries or procedures?    no  If yes, update Summary List   Subjective Functional Status/Changes:  []  No changes reported     \"I feel stronger and less pain\"     OBJECTIVE    17 min Therapeutic Exercise:  [x]  See flow sheet   Rationale:      increase ROM, increase strength and improve coordination to improve the patients ability to safely perform ADLs, bending/stooping/ lifting; prolong sitting, standing and ambulation; and hold yoga poses with no pain and with min to no limitations        35 min Therapeutic Activity: [x]  See flow sheet  postural training  squat IR/ER   irmaterwalk fwd/retro/lateral  10 # KB Around [place] feet shoulder width/feet together  10# KB Pullover standing/5# in HL    10 # KB waittress Carry> + suitcase carry 20# KB  RDL with 45# KB  goblet   farmer's carry 25#/20#KB   Rationale:    increase ROM, increase strength and improve coordination to improve the patients ability to  safely perform ADLs, bending/stooping/ lifting; prolong sitting, standing and ambulation; and hold yoga poses with no pain and with min to no limitations    Billed With/As:   [x] TE   [x] TA   [] Neuro   [] Self Care Patient Education: [x] Review HEP    [] Progressed/Changed HEP based on:   [x] positioning   [x] body mechanics   [x] transfers   [x] heat/ice application    [x] other: Pt ed on importance and benefits of compliance with HEP, core strength/stability and proper posture; pt verbalized understanding     Other Objective/Functional Measures:    VCs + demo to perform proper technique for TE  (I) with all weighted carry TE without producing pain    increased to 45# from 20# with RDLs and increased to 20# with around the [place] from 10# without difficulty    Post Treatment Pain Level (on 0 to 10) scale:  0/ 10     ASSESSMENT  Assessment/Changes in Function:   demos good squat form with no pain   decrease HS flexibility    []  See Progress Note/Recertification   Patient will continue to benefit from skilled PT services to modify and progress therapeutic interventions, address functional mobility deficits, address ROM deficits, address strength deficits, analyze and address soft tissue restrictions, analyze and cue movement patterns, analyze and modify body mechanics/ergonomics, assess and modify postural abnormalities and instruct in home and community integration to attain remaining goals. Progress toward goals / Updated goals:  New Goals to be achieved in __2-4__  weeks:  1.  Patient will be independent with HEP to self-manage and prevent symptoms upon DC. 2.  Patient will improve FOTO score to at least 70 points to indicate improved functional status. 3.  Patient will demonstrate 5/5 hip abduction MMT bilaterally to improve pelvic stability with recreational activity. addressed with lateral amb  4. Patient to demonstrate safe and proper floor to stand transfers to promote safety and independence when performing yard work. progressing, reports no difficulty with yard work x 2 weeks     PLAN  [x]  Upgrade activities as tolerated yes Continue plan of care   []  Discharge due to :    []  Other:      Therapist: Amanda German PTA    Date: 3/23/2021 Time: 9:15 AM     Future Appointments   Date Time Provider German Campos   3/25/2021  8:00 AM Kieran Almeida, PT [facility]   3/30/2021  7:15 AM Handy Greco PTA [facility]   4/6/2021  7:15 AM Alethea Rausch, [place] [facility]   4/8/2021  8:00 AM Tamanna Thompson, PT [facility]   4/13/2021  7:15 AM [facility]   4/15/2021  8:00 AM Tamanna Thompson, PT [facility]

## 2021-03-25 ENCOUNTER — HOSPITAL ENCOUNTER (OUTPATIENT)
Dept: PHYSICAL THERAPY | Age: 66
Discharge: HOME | End: 2021-03-25
Payer: MEDICARE

## 2021-03-25 PROCEDURE — 97110 THERAPEUTIC EXERCISES: CPT

## 2021-03-25 PROCEDURE — 97530 THERAPEUTIC ACTIVITIES: CPT

## 2021-03-25 NOTE — PROGRESS NOTES
PHYSICAL THERAPY - DAILY TREATMENT NOTE    Patient Name: Cherri Bains        Date: 3/25/2021  : 1955   YES Patient  Verified  Visit #:   11   of   12-15  Insurance: Payor: Debby Weaver / Plan: [place] MEDICARE PART A & B / Product Type: Medicare /      In time: 7:50 Out time: 8:44   Total Treatment Time: 54     Medicare/BCBS Time Tracking (below)   Total Timed Codes (min):  54 1:1 Treatment Time:  53     TREATMENT AREA = Low back pain [M54.5]    SUBJECTIVE    Pain Level (on 0 to 10 scale):  0  / 10   Medication Changes/New allergies or changes in medical history, any new surgeries or procedures? NO    If yes, update Summary List   Subjective Functional Status/Changes:  []  No changes reported     \"I ordered a 10lb club. \"          OBJECTIVE     Therapeutic Procedures:  Min Procedure Specifics + Rationale   n/a [x]  Patient Education (performed throughout session) [x] Review HEP    [] Progressed/Changed HEP based on:   [] proper performance and advancement of Therex/TA   [] reduction in pain level    [] increased functional capacity       [] change in directional preference   15(14) [x] Therapeutic Exercise   [x]  See Flowsheet   Rationale: increase ROM and increase strength to improve the patients ability to participate in ADL's    40 [x] Therapeutic Activity   [x]  See Flowsheet  KB-  20# ATB 2x15  10# Standing Pullover  10# Halo  Goblet Squat  Clubbell-  Order Press Out  Order Squat    TGU with Shoe  TGU with 5# KB    Mace-(10#)  Switch Squat  Offset Overhead Lunge  Side loaded lunge  Rationale:  To improve safety, proprioception, coordination, and efficiency with tasks         Other Objective/Functional Measures:    Educated patient re: mace form  TC's, VC's, and Demonstration provided for Mace/Club/KB and TGU     Post Treatment Pain Level (on 0 to 10) scale:   0  / 10     ASSESSMENT    Assessment/Changes in Function:       Noted limitation in right thoracic rotation during TGU         Patient will continue to benefit from skilled PT services to modify and progress therapeutic interventions, address functional mobility deficits, address ROM deficits, address strength deficits, analyze and address soft tissue restrictions, analyze and cue movement patterns, analyze and modify body mechanics/ergonomics, assess and modify postural abnormalities and instruct in home and community integration  to attain remaining goals   Progress toward goals / Updated goals:    Good progress in mobility      PLAN    [x]  Upgrade activities as tolerated  [x]  Update interventions per flow sheet YES Continue plan of care   []  Discharge due to :    []  Other:      Therapist: Lisseth Dominguez \"BJ\" Buddy, DPT, Cert. MDT, Cert. DN, Cert. SMT, Dip.  Osteopractic    Date: 3/25/2021 Time: 8:01 AM     Future Appointments   Date Time Provider German Campos   3/30/2021  7:15 AM Leron Osgood, [place] [facility]   4/6/2021  7:15 AM Leron Osgood, PTA [facility]   4/8/2021  8:00 AM Nestor Mata, PT [facility]   4/13/2021  7:15 AM Leron Osgood, PTA [facility]   4/15/2021  8:00 AM Nestor Mata, PT [facility]

## 2021-03-30 ENCOUNTER — HOSPITAL ENCOUNTER (OUTPATIENT)
Dept: PHYSICAL THERAPY | Age: 66
Discharge: HOME | End: 2021-03-30
Payer: MEDICARE

## 2021-03-30 PROCEDURE — 97110 THERAPEUTIC EXERCISES: CPT

## 2021-03-30 PROCEDURE — 97530 THERAPEUTIC ACTIVITIES: CPT

## 2021-03-30 NOTE — PROGRESS NOTES
PHYSICAL THERAPY - DAILY TREATMENT NOTE    Patient Name: Ancelmo Rosario        Date: 3/30/2021  : 1955   yes Patient  Verified  Visit #:   12  of   15  Insurance: Payor: Asa Cortes / Plan: [place] MEDICARE PART A & B / Product Type: Medicare /      In time: 795 Out time: 374   Total Treatment Time: 55     Medicare/BCBS Time Tracking (below)   Total Timed Codes (min):    55 1:1 Treatment Time:   55     TREATMENT AREA =  Low back pain [M54.5]    SUBJECTIVE  Pain Level (on 0 to 10 scale): 0 / 10   Medication Changes/New allergies or changes in medical history, any new surgeries or procedures?    no  If yes, update Summary List   Subjective Functional Status/Changes:  []  No changes reported     \"I am sore, I felt it pretty good in left hip. I prob shouldn't have went surfing after I had PT last thurs. I had to take 2 days off after.  I do feel better popping up on my surf board      OBJECTIVE    25 min Therapeutic Exercise:  [x]  See flow sheet   Rationale:      increase ROM, increase strength and improve coordination to improve the patients ability to safely perform ADLs, bending/stooping/ lifting; prolong sitting, standing and ambulation; and hold yoga poses with no pain and with min to no limitations        30 min Therapeutic Activity: [x]  See flow sheet  postural training  squat IR/ER   taya fwd/retro  10 # KB Around the [place] feet shoulder width/feet together  10# KB Pullover standing    RDL with 45# KB  goblet squat 20# KB  bosu squats  bosu step ups    Rationale:    increase ROM, increase strength and improve coordination to improve the patients ability to  safely perform ADLs, bending/stooping/ lifting; prolong sitting, standing and ambulation; and hold yoga poses with no pain and with min to no limitations    Billed With/As:   [x] TE   [x] TA   [] Neuro   [] Self Care Patient Education: [x] Review HEP    [] Progressed/Changed HEP based on:   [x] positioning   [x] body mechanics   [x] transfers   [x] heat/ice application    [x] other: Pt ed on importance and benefits of compliance with HEP, core strength/stability and proper posture; pt verbalized understanding     Other Objective/Functional Measures:    VCs + demo to perform proper technique for TE  Initiated TE per flowsheet without c/o p!    demos good core control and form with Big X, Little X   c/o decrease left  [place] SH strength performing OH press in 1/2 kneel with 45# BB, no pain noted   no LOB with tandem stance performing pallof press   Post Treatment Pain Level (on 0 to 10) scale:  0/ 10     ASSESSMENT  Assessment/Changes in Function:   decrease HS flexibility with WGS , no pain  1 UE assist required with bosu step ups  squats on BOSU to ~ 60 degs without UE assist    []  See Progress Note/Recertification   Patient will continue to benefit from skilled PT services to modify and progress therapeutic interventions, address functional mobility deficits, address ROM deficits, address strength deficits, analyze and address soft tissue restrictions, analyze and cue movement patterns, analyze and modify body mechanics/ergonomics, assess and modify postural abnormalities and instruct in home and community integration to attain remaining goals. Progress toward goals / Updated goals:  New Goals to be achieved in __2-4__  weeks:  1.  Patient will be independent with HEP to self-manage and prevent symptoms upon DC. 2.  Patient will improve FOTO score to at least 70 points to indicate improved functional status. 3.  Patient will demonstrate 5/5 hip abduction MMT bilaterally to improve pelvic stability with recreational activity. addressed with lateral amb  4. Patient to demonstrate safe and proper floor to stand transfers to promote safety and independence when performing yard work. progressing, reports no difficulty with yard work x 2 weeks     PLAN  [x]  Upgrade activities as tolerated yes Continue plan of care   []  Discharge due to :    [] Other:      Therapist: Laruen Garcia PTA    Date: 3/30/2021 Time: 8:42 AM     Future Appointments   Date Time Provider German Campos   4/6/2021  7:15 AM Jerson Kinney PTA [facility]   4/8/2021  8:00 AM Rodolfo Miller, PT [facility]   4/13/2021  7:15 AM Jerson Kinney PTA [facility]   4/15/2021  8:00 AM Rodolfo Miller, PT [facility]

## 2021-04-06 ENCOUNTER — HOSPITAL ENCOUNTER (OUTPATIENT)
Dept: PHYSICAL THERAPY | Age: 66
Discharge: HOME | End: 2021-04-06
Payer: MEDICARE

## 2021-04-06 PROCEDURE — 97110 THERAPEUTIC EXERCISES: CPT

## 2021-04-06 PROCEDURE — 97530 THERAPEUTIC ACTIVITIES: CPT

## 2021-04-06 NOTE — PROGRESS NOTES
PHYSICAL THERAPY - DAILY TREATMENT NOTE    Patient Name: Carl Salcedo        Date: 2021  : 1955   yes Patient  Verified  Visit #:   15  of   15  Insurance: Payor: Chichi Vences / Plan: [place] MEDICARE PART A & B / Product Type: Medicare /      In time: 652 Out time: 815   Total Treatment Time: 58     Medicare/BCBS Time Tracking (below)   Total Timed Codes (min):    58 1:1 Treatment Time:   58     TREATMENT AREA =  Low back pain [M54.5]    SUBJECTIVE  Pain Level (on 0 to 10 scale): 0 / 10   Medication Changes/New allergies or changes in medical history, any new surgeries or procedures?    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 Not pain in the back now but felt it when I was bending over a bunch.  The BASIL really helps, I use it a lot at work\"     OBJECTIVE    28  min Therapeutic Exercise:  [x]  See flow sheet   Rationale:      increase ROM, increase strength and improve coordination to improve the patients ability to safely perform ADLs, bending/stooping/ lifting; prolong sitting, standing and ambulation; and hold yoga poses with no pain and with min to no limitations        30 min Therapeutic Activity: [x]  See flow sheet  postural training  squat IR/ER   taya fwd/retro  20 # KB Around the [place] feet shoulder width/feet together  land mind clean + OH press  bosu squats  bosu step ups    Rationale:    increase ROM, increase strength and improve coordination to improve the patients ability to  safely perform ADLs, bending/stooping/ lifting; prolong sitting, standing and ambulation; and hold yoga poses with no pain and with min to no limitations    Billed With/As:   [x] TE   [x] TA   [] Neuro   [] Self Care Patient Education: [x] Review HEP    [] Progressed/Changed HEP based on:   [x] positioning   [x] body mechanics   [x] transfers   [x] heat/ice application    [x] other: Pt ed on importance and benefits of compliance with HEP, core strength/stability and proper posture; pt verbalized understanding     Other Objective/Functional Measures:    VCs + demo to perform proper technique for TE  Initiated lunge to bosu + rot with 5# KB, land mind clean + OH press with 45#, and increased to 20# for ATW without c/o p!  attempted 20# with lunges + rot; however, decreased to 5# due to LBP    Post Treatment Pain Level (on 0 to 10) scale:  0/ 10     ASSESSMENT  Assessment/Changes in Function:    Demos decrease GAYLE with lunges, corrected with instruction and demos proper form  Pt executed proper core activation with all TE  Progressed TE without c/o increase p! []  See Progress Note/Recertification   Patient will continue to benefit from skilled PT services to modify and progress therapeutic interventions, address functional mobility deficits, address ROM deficits, address strength deficits, analyze and address soft tissue restrictions, analyze and cue movement patterns, analyze and modify body mechanics/ergonomics, assess and modify postural abnormalities and instruct in home and community integration to attain remaining goals. Progress toward goals / Updated goals:  New Goals to be achieved in __2-4__  weeks:  1.  Patient will be independent with HEP to self-manage and prevent symptoms upon DC. 2.  Patient will improve FOTO score to at least 70 points to indicate improved functional status. 3.  Patient will demonstrate 5/5 hip abduction MMT bilaterally to improve pelvic stability with recreational activity. addressed with lateral amb  4. Patient to demonstrate safe and proper floor to stand transfers to promote safety and independence when performing yard work. progressing, reports no difficulty with yard work x 2 weeks     PLAN  [x]  Upgrade activities as tolerated yes Continue plan of care   []  Discharge due to :    []  Other:      Therapist: Carmel Munguia PTA    Date: 4/6/2021 Time: 8:42 AM     Future Appointments   Date Time Provider German Campos   4/8/2021  8:00 AM Kirit Menard, PT [facility]   4/13/2021  7:15 AM Dayanara Stovall PTA [email or web address]   4/15/2021  8:00 AM Kirit Menard, PT [facility]

## 2021-04-08 ENCOUNTER — HOSPITAL ENCOUNTER (OUTPATIENT)
Dept: PHYSICAL THERAPY | Age: 66
Discharge: HOME | End: 2021-04-08
Payer: MEDICARE

## 2021-04-08 PROCEDURE — 97530 THERAPEUTIC ACTIVITIES: CPT

## 2021-04-08 PROCEDURE — 97110 THERAPEUTIC EXERCISES: CPT

## 2021-04-08 NOTE — PROGRESS NOTES
PHYSICAL THERAPY - DAILY TREATMENT NOTE    Patient Name: Macho Oakes        Date: 2021  : 1955   YES Patient  Verified  Visit #:   15   of   15-17  Insurance: Payor: Sandra Padilla / Plan: [place] MEDICARE PART A & B / Product Type: Medicare /      In time: 7:54 Out time: 8:34   Total Treatment Time: 38     Medicare/BCBS Time Tracking (below)   Total Timed Codes (min):  38 1:1 Treatment Time:  38     TREATMENT AREA = Low back pain [M54.5]    SUBJECTIVE    Pain Level (on 0 to 10 scale):  0  / 10   Medication Changes/New allergies or changes in medical history, any new surgeries or procedures? NO    If yes, update Summary List   Subjective Functional Status/Changes:  []  No changes reported     \"I saw Dr. Billie Scheuermann and he said I've done excellent with you guys and I can be done when my therapy is over with. \"   \"I bought a 10lb club, and I watched those videos you told me about. Can you review some of them with me? OBJECTIVE     Therapeutic Procedures:  Min Procedure Specifics + Rationale   n/a [x]  Patient Education (performed throughout session) [x] Review HEP    [] Progressed/Changed HEP based on:   [] proper performance and advancement of Therex/TA   [] reduction in pain level    [] increased functional capacity       [] change in directional preference   8 [x] Therapeutic Exercise   [x]  See Flowsheet   Rationale: increase ROM and increase strength to improve the patients ability to participate in ADL's    30 [x] Therapeutic Activity   [x]  See Flowsheet  Clubbell 10#  Inside [place]  Outside [employer] Swipe  Order Squat  Press Squat  Rationale: To improve safety, proprioception, coordination, and efficiency with tasks         Other Objective/Functional Measures:    Discussed DC NV.  Patient agreed     Post Treatment Pain Level (on 0 to 10) scale:   0  / 10     ASSESSMENT    Assessment/Changes in Function:       Performed therex well with mild cueing and use of mirror         Patient will continue to benefit from skilled PT services to instruct in home and community integration  to attain remaining goals   Progress toward goals / Updated goals:    Progressing to DC NV     PLAN    [x]  Upgrade activities as tolerated  [x]  Update interventions per flow sheet YES Continue plan of care   []  Discharge due to :    []  Other:      Therapist: Arnoldo Salinas \"BJ\" ZEUS GoodwinT, Cert. MDT, Cert. DN, Cert. SMT, Dip.  Osteopractic    Date: 4/8/2021 Time: 7:57 AM     Future Appointments   Date Time Provider German Campos   4/8/2021  8:00 AM Maria Ku, PT [facility]   4/13/2021  7:15 AM Rhina Flores, PTA [facility]   4/15/2021  8:00 AM Maria Ku, PT [facility]

## 2021-04-13 ENCOUNTER — APPOINTMENT (OUTPATIENT)
Dept: PHYSICAL THERAPY | Age: 66
End: 2021-04-13
Payer: MEDICARE

## 2021-04-15 ENCOUNTER — HOSPITAL ENCOUNTER (OUTPATIENT)
Dept: PHYSICAL THERAPY | Age: 66
Discharge: HOME | End: 2021-04-15
Payer: MEDICARE

## 2021-04-15 PROCEDURE — 97530 THERAPEUTIC ACTIVITIES: CPT

## 2021-04-15 PROCEDURE — 97110 THERAPEUTIC EXERCISES: CPT

## 2021-04-15 NOTE — PROGRESS NOTES
[address]  [facility] PHYSICAL THERAPY  [address] [facility] [address] [facility] [address], [address] - Phone: [phone]  Fax: [phone] SUMMARY  Patient Name: Sylvia Centeno : 1955   Treatment/Medical Diagnosis: Low back pain [M54.5]   Referral Source: Ritu Blanchard MD     Date of Initial Visit: 21 Attended Visits: 15 Missed Visits: 0     SUMMARY OF TREATMENT  Patient's POC has consisted of therex, therapeutic activities, manual therapy prn, modalities prn, pt. education, and a comprehensive HEP. Treatment strategies used to address functional mobility deficits, ROM deficits, strength deficits, analyze and address soft tissue restrictions, analyze and cue movement patterns, analyze and modify body mechanics/ergonomics, assess and modify postural abnormalities and instruct in home and community integration. CURRENT STATUS  Patient made excellent progress in recovery. He has resumed recreational exercise and has been able to return to surfing without complaints. Patient denies any limitations in ADL's, work duties, or recreational activity. GOALS/MEASURE OF PROGRESS Goal Met? 1.  Patient will be independent with HEP to self-manage and prevent symptoms upon DC. 2.  Patient will improve FOTO score to at least 70 points to indicate improved functional status. 3.  Patient will demonstrate 5/5 hip abduction MMT bilaterally to improve pelvic stability with recreational activity. 4. Patient to demonstrate safe and proper floor to stand transfers to promote safety and independence  MET      MET      MET      MET     RECOMMENDATIONS  Discontinue therapy. Progressing towards or have reached established goals. If you have any questions/comments please contact us directly at [phone]. Thank you for allowing us to assist in the care of your patient. Therapist Signature: Ross Avery \"BJ\" Meghan Ontiveros DPT, Cert. MDT, Cert. DN, Cert. SMT, Dip.  Osteopractic Date: 4/15/21 Reporting Period: 5/79/68-5/80/41     Certification Period 2/16/21-5/15/21 Time: 9:42 AM     NOTE TO PHYSICIAN:  PLEASE COMPLETE THE ORDERS BELOW AND FAX TO   [facility]: [phone]  If you are unable to process this request in 24 hours please contact our office: [phone]    ___ I have read the above report and request that my patient continue as recommended.   ___ I have read the above report and request that my patient continue therapy with the following changes/special instructions:_________________________________________________________   ___ I have read the above report and request that my patient be discharged from therapy.      Physician Signature:        Date:     Time:

## 2021-04-15 NOTE — PROGRESS NOTES
PHYSICAL THERAPY - DAILY TREATMENT NOTE    Patient Name: Macho Oakes        Date: 4/15/2021  : 1955   YES Patient  Verified  Visit #:   15   of   16  Insurance: Payor: [place] / Plan: [place] MED[place]ARE PART A & B / Product Type: Medicare /      In time: 7:55 Out time: 8:40   Total Treatment Time: 45     Medicare/BCBS Time Tracking (below)   Total Timed Codes (min):  45 1:1 Treatment Time:  45     TREATMENT AREA = Low back pain [M54.5]    SUBJECTIVE    Pain Level (on 0 to 10 scale):  0  / 10   Medication Changes/New allergies or changes in medical history, any new surgeries or procedures? NO    If yes, update Summary List   Subjective Functional Status/Changes:  []  No changes reported     \"I've felt great. I ended up surfing twice. Been feeling good. \"          OBJECTIVE     Therapeutic Procedures:  Min Procedure Specifics + Rationale   n/a [x]  Patient Education (performed throughout session) [x] Review HEP    [] Progressed/Changed HEP based on:   [] proper performance and advancement of Therex/TA   [] reduction in pain level    [] increased functional capacity       [] change in directional preference   45 [x] Therapeutic Activity   [x]  See Flowsheet  [employer] 10#  [place]  [place]  Pullover  Order Squat  Order Squat Press  Gamma Cast  Re-assessment  Rationale:  To improve safety, proprioception, coordination, and efficiency with tasks       Other Objective/Functional Measures:    See DC     Post Treatment Pain Level (on 0 to 10) scale:   0  / 10     ASSESSMENT    Assessment/Changes in Function:       See DC         Patient will continue to benefit from skilled PT services to modify and progress therapeutic interventions, address functional mobility deficits, address ROM deficits, address strength deficits, analyze and address soft tissue restrictions, analyze and cue movement patterns, analyze and modify body mechanics/ergonomics and instruct in home and community integration  to attain remaining goals   Progress toward goals / Updated goals:    See DC     PLAN    [x]  Upgrade activities as tolerated  [x]  Update interventions per flow sheet NO Continue plan of care   [x]  Discharge due to : Program complete   []  Other:      Therapist: Ko Kay \"PEDRO\" MARIE Goodwin, Cert. MDT, Cert. DN, Cert. SMT, Dip. Osteopractic    Date: 4/15/2021 Time: 8:13 AM   No future appointments.